# Patient Record
Sex: MALE | Race: WHITE | HISPANIC OR LATINO | Employment: FULL TIME | URBAN - METROPOLITAN AREA
[De-identification: names, ages, dates, MRNs, and addresses within clinical notes are randomized per-mention and may not be internally consistent; named-entity substitution may affect disease eponyms.]

---
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
## 2022-11-29 ENCOUNTER — HOSPITAL ENCOUNTER (EMERGENCY)
Facility: MEDICAL CENTER | Age: 22
End: 2022-11-30
Attending: EMERGENCY MEDICINE
Payer: MEDICAID

## 2022-11-29 VITALS
RESPIRATION RATE: 18 BRPM | BODY MASS INDEX: 24.39 KG/M2 | HEART RATE: 78 BPM | DIASTOLIC BLOOD PRESSURE: 70 MMHG | OXYGEN SATURATION: 95 % | TEMPERATURE: 97 F | WEIGHT: 190.04 LBS | SYSTOLIC BLOOD PRESSURE: 114 MMHG | HEIGHT: 74 IN

## 2022-11-29 DIAGNOSIS — G44.209 TENSION HEADACHE: ICD-10-CM

## 2022-11-29 DIAGNOSIS — B34.9 VIRAL SYNDROME: ICD-10-CM

## 2022-11-29 PROCEDURE — 99283 EMERGENCY DEPT VISIT LOW MDM: CPT

## 2022-11-29 PROCEDURE — C9803 HOPD COVID-19 SPEC COLLECT: HCPCS | Performed by: EMERGENCY MEDICINE

## 2022-11-29 RX ORDER — IBUPROFEN 600 MG/1
600 TABLET ORAL ONCE
Status: COMPLETED | OUTPATIENT
Start: 2022-11-30 | End: 2022-11-30

## 2022-11-29 RX ORDER — ACETAMINOPHEN 325 MG/1
650 TABLET ORAL ONCE
Status: COMPLETED | OUTPATIENT
Start: 2022-11-30 | End: 2022-11-30

## 2022-11-30 LAB
FLUAV RNA SPEC QL NAA+PROBE: POSITIVE
FLUBV RNA SPEC QL NAA+PROBE: NEGATIVE
RSV RNA SPEC QL NAA+PROBE: NEGATIVE
SARS-COV-2 RNA RESP QL NAA+PROBE: NOTDETECTED
SPECIMEN SOURCE: ABNORMAL

## 2022-11-30 PROCEDURE — A9270 NON-COVERED ITEM OR SERVICE: HCPCS | Performed by: EMERGENCY MEDICINE

## 2022-11-30 PROCEDURE — A9270 NON-COVERED ITEM OR SERVICE: HCPCS

## 2022-11-30 PROCEDURE — 700102 HCHG RX REV CODE 250 W/ 637 OVERRIDE(OP)

## 2022-11-30 PROCEDURE — 0241U HCHG SARS-COV-2 COVID-19 NFCT DS RESP RNA 4 TRGT MIC: CPT

## 2022-11-30 PROCEDURE — 700102 HCHG RX REV CODE 250 W/ 637 OVERRIDE(OP): Performed by: EMERGENCY MEDICINE

## 2022-11-30 RX ADMIN — ACETAMINOPHEN 650 MG: 325 TABLET, FILM COATED ORAL at 00:20

## 2022-11-30 RX ADMIN — IBUPROFEN 600 MG: 600 TABLET, FILM COATED ORAL at 00:20

## 2022-11-30 NOTE — DISCHARGE INSTRUCTIONS
You were tested for flu COVID and RSV today.  Results of come back later this evening you can check the results on [facility].  If it does come back positive for any those viruses know that you are contagious take ibuprofen Tylenol as needed for symptoms return to the ED for any concern for difficulty breathing or vomiting.

## 2022-11-30 NOTE — ED TRIAGE NOTES
"Margarito Huff  22 y.o.  Chief Complaint   Patient presents with    Headache     Ambulatory to lobby with steady gait, pt reporting pulsing headache after working today. Pt works in construction. Pt states \"I have a headache because I was sweating and it's cold outside.\" A&Ox4, speaking in full sentences, NAD, VSS, placed back in lobby.  "

## 2023-11-19 ENCOUNTER — APPOINTMENT (OUTPATIENT)
Dept: RADIOLOGY | Facility: IMAGING CENTER | Age: 23
End: 2023-11-19
Attending: STUDENT IN AN ORGANIZED HEALTH CARE EDUCATION/TRAINING PROGRAM
Payer: MEDICAID

## 2023-11-19 ENCOUNTER — OFFICE VISIT (OUTPATIENT)
Dept: URGENT CARE | Facility: CLINIC | Age: 23
End: 2023-11-19
Payer: MEDICAID

## 2023-11-19 VITALS
WEIGHT: 180 LBS | RESPIRATION RATE: 16 BRPM | BODY MASS INDEX: 23.1 KG/M2 | TEMPERATURE: 97.4 F | DIASTOLIC BLOOD PRESSURE: 62 MMHG | SYSTOLIC BLOOD PRESSURE: 94 MMHG | HEART RATE: 76 BPM | OXYGEN SATURATION: 98 % | HEIGHT: 74 IN

## 2023-11-19 DIAGNOSIS — S99.922A FOOT INJURY, LEFT, INITIAL ENCOUNTER: ICD-10-CM

## 2023-11-19 PROCEDURE — 73630 X-RAY EXAM OF FOOT: CPT | Mod: TC,LT | Performed by: STUDENT IN AN ORGANIZED HEALTH CARE EDUCATION/TRAINING PROGRAM

## 2023-11-19 PROCEDURE — 3078F DIAST BP <80 MM HG: CPT | Performed by: STUDENT IN AN ORGANIZED HEALTH CARE EDUCATION/TRAINING PROGRAM

## 2023-11-19 PROCEDURE — 99203 OFFICE O/P NEW LOW 30 MIN: CPT | Performed by: STUDENT IN AN ORGANIZED HEALTH CARE EDUCATION/TRAINING PROGRAM

## 2023-11-19 PROCEDURE — 3074F SYST BP LT 130 MM HG: CPT | Performed by: STUDENT IN AN ORGANIZED HEALTH CARE EDUCATION/TRAINING PROGRAM

## 2023-11-20 NOTE — PROGRESS NOTES
"Subjective     Margarito Huff is a 23 y.o. male who presents with Foot Injury            Margarito is a 23 y.o. male who presents to urgent care with left foot injury.  Patient states he was helping out with some construction and a pipe dropped on his left foot.  This occurred on Wednesday.  Left foot pain is improving since injury but he is still experiencing some pain/discomfort especially while walking him who presents to urgent care today hoping to get an x-ray.  Patient noticed some swelling/bruising initially which has gone down.  Patient is able to ambulate.  No decreased range of motion or numbness/tingling.        ROS           Objective     BP 94/62 (BP Location: Left arm, Patient Position: Sitting, BP Cuff Size: Adult)   Pulse 76   Temp 36.3 °C (97.4 °F) (Temporal)   Resp 16   Ht 1.88 m (6' 2\")   Wt 81.6 kg (180 lb)   SpO2 98%   BMI 23.11 kg/m²      Physical Exam  Vitals reviewed.   Constitutional:       Appearance: Normal appearance.   HENT:      Head: Normocephalic and atraumatic.      Nose: Nose normal.   Eyes:      Extraocular Movements: Extraocular movements intact.      Conjunctiva/sclera: Conjunctivae normal.      Pupils: Pupils are equal, round, and reactive to light.   Cardiovascular:      Rate and Rhythm: Normal rate.   Pulmonary:      Effort: Pulmonary effort is normal.   Musculoskeletal:      Left ankle: Normal.      Left foot: Normal range of motion and normal capillary refill. Normal pulse.        Feet:    Skin:     General: Skin is warm and dry.      Capillary Refill: Capillary refill takes less than 2 seconds.   Neurological:      General: No focal deficit present.      Mental Status: He is alert. Mental status is at baseline.      Gait: Gait is intact.                  RADIOLOGY RESULTS   DX-FOOT-COMPLETE 3+ LEFT    Result Date: 11/19/2023 11/19/2023 6:32 PM HISTORY/REASON FOR EXAM:  Pain/Deformity Following Trauma; 1st and 2nd metatarsals Crushing injury, dorsum pain x 4 " days TECHNIQUE/EXAM DESCRIPTION AND NUMBER OF VIEWS: 3 views of the LEFT foot. COMPARISON:  None. FINDINGS: No acute fracture or dislocation. No joint osteoarthritis.     No acute osseous abnormality.                   Assessment & Plan        1. Foot injury, left, initial encounter  - DX-FOOT-COMPLETE 3+ LEFT   - PER RADIOLOGY: No acute osseous abnormality.    Differential diagnoses, supportive care measures (rest, ice, elevation, OTC Tylenol/ibuprofen) and indications for immediate follow-up discussed with patient. Pathogenesis of diagnosis discussed including typical length and natural progression.  Follow up with PCP.    Instructed to return to urgent care or nearest emergency department if symptoms fail to improve, for any change in condition, further concerns, or new concerning symptoms.    Patient states understanding and agrees with the plan of care and discharge instructions.

## 2023-12-20 ENCOUNTER — APPOINTMENT (OUTPATIENT)
Dept: RADIOLOGY | Facility: IMAGING CENTER | Age: 23
End: 2023-12-20
Payer: OTHER MISCELLANEOUS

## 2023-12-20 ENCOUNTER — OCCUPATIONAL MEDICINE (OUTPATIENT)
Dept: URGENT CARE | Facility: CLINIC | Age: 23
End: 2023-12-20
Payer: OTHER MISCELLANEOUS

## 2023-12-20 VITALS
RESPIRATION RATE: 16 BRPM | TEMPERATURE: 98.1 F | WEIGHT: 171 LBS | SYSTOLIC BLOOD PRESSURE: 120 MMHG | HEART RATE: 74 BPM | HEIGHT: 74 IN | BODY MASS INDEX: 21.94 KG/M2 | OXYGEN SATURATION: 100 % | DIASTOLIC BLOOD PRESSURE: 82 MMHG

## 2023-12-20 DIAGNOSIS — S61.431A PUNCTURE WOUND OF RIGHT HAND WITHOUT FOREIGN BODY, INITIAL ENCOUNTER: ICD-10-CM

## 2023-12-20 DIAGNOSIS — S69.91XA INJURY OF RIGHT HAND, INITIAL ENCOUNTER: ICD-10-CM

## 2023-12-20 PROCEDURE — 73130 X-RAY EXAM OF HAND: CPT | Mod: TC,RT | Performed by: PHYSICIAN ASSISTANT

## 2023-12-20 RX ORDER — AMOXICILLIN AND CLAVULANATE POTASSIUM 875; 125 MG/1; MG/1
1 TABLET, FILM COATED ORAL 2 TIMES DAILY
Qty: 10 TABLET | Refills: 0 | Status: SHIPPED | OUTPATIENT
Start: 2023-12-20 | End: 2023-12-25

## 2023-12-20 NOTE — LETTER
[facility] [address] [place] [address]  Phone:  [phone] - Fax:  [phone]   [facility] Progress Report and Disability Certification  Date of Service: 12/20/2023   No Show:  No  Date / Time of Next Visit: 12/23/2023 5:00 PM   Claim Information   Patient Name: Margarito Huff  Claim Number:     Employer:   [employer] Date of Injury: 12/20/2023     Insurer / TPA:    ID / SSN:     Occupation: Construction  Diagnosis: The encounter diagnosis was Puncture wound of right hand without foreign body, initial encounter.    Medical Information   Related to Industrial Injury? 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  He states that he was moving boards and suffered a nail puncture to his right hand when attempting to flip into a 4.  He reports that his tetanus is up-to-date.  He reports pain to his right hand is 8\10.  He has not take anything for his pain.  He denies numbness and tingling.  He has right-hand-dominant.    Review of Systems   Musculoskeletal:         + Right hand pain      Objective Findings: Physical Exam  Vitals and nursing note reviewed.   Musculoskeletal:        Hands:        Comments: Right hand: Puncture wound to palmar aspect of mid hand.  No foreign body identified.  Full range of motion of right hand.  CMS intact        Pre-Existing Condition(s):     Assessment:   Initial Visit    Status: Additional Care Required  Permanent Disability:No    Plan:   Comments:Augmentin, alternate Tylenol ibuprofen as needed for pain    Diagnostics: X-ray    Comments:       Disability Information   Status: Released to Restricted Duty    From:  12/20/2023  Through: 12/23/2023 Restrictions are: Temporary   Physical Restrictions   Sitting:    Standing:    Stooping:    Bending:      Squatting:    Walking:    Climbing:    Pushing:      Pulling:    Other:    Reaching Above Shoulder (L):    Reaching Above Shoulder (R):       Reaching Below Shoulder (L):    Reaching Below Shoulder (R):      Not to exceed Weight Limits   Carrying(hrs):   Weight Limit(lb): < or = to 25 pounds  Comments:Right hand Lifting(hrs):   Weight  Limit(lb):     Comments: Limit use of right hand.  Keep wound covered while at work    Repetitive Actions   Hands: i.e. Fine Manipulations from Grasping:     Feet: i.e. Operating Foot Controls:     Driving / Operate Machinery:     Health Care Provider’s Original or Electronic Signature  GUDELIA Fortune Health Care Provider’s Original or Electronic Signature    Hans Somers DO MPH     Clinic Name / Location: [place] [address]  [place] [address] Clinic Phone Number: Dept: [phone]   Appointment Time: 5:00 Pm Visit Start Time: 6:11 PM   Check-In Time:  5:37 Pm Visit Discharge Time:     Original-Treating Physician or Chiropractor    Page 2-Insurer/TPA    Page 3-Employer    Page 4-Employee

## 2023-12-20 NOTE — LETTER
"    EMPLOYEE’S CLAIM FOR COMPENSATION/ REPORT OF INITIAL TREATMENT  FORM C-4  PLEASE TYPE OR PRINT    EMPLOYEE’S CLAIM - PROVIDE ALL INFORMATION REQUESTED   First Name                    HOA Pimentel Last Name  Celi Huff Birthdate                    2000                Sex  []M  []F Claim Number (Insurer’s Use Only)     Home Address  [address] Age  23 y.o. 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 (if applicable)  taking the nail of the wood plane    How did this injury or occupational disease occur? (Be specific and answer in detail. Use additional sheet if necessary)  I was taking the nails and I flip the plane wood ant then I got it in my hand   If you believe that you have an occupational disease, when did you first have knowledge of the disability and its relationship to your employment?  n/a Witnesses to the Accident (if applicable)  ja      Nature of Injury or Occupational Disease  Puncture  Part(s) of Body Injured or Affected  Hand (R) N/A N/A    I CERTIFY THAT THE " ABOVE IS TRUE AND CORRECT TO T HE BEST OF MY KNOWLEDGE AND THAT I HAVE PROVIDED THIS INFORMATION IN ORDER TO OBTAIN THE BENEFITS OF [place], OR CHAPTER [place]).  I HEREBY AUTHORIZE ANY PHYSICIAN, CHIROPRACTOR, SURGEON, PRACTITIONER OR ANY OTHER PERSON, ANY HOSPITAL, INCLUDING [facility] OR [facility], ANY  MEDICAL SERVICE ORGANIZATION, ANY INSURANCE COMPANY, OR OTHER INSTITUTION OR ORGANIZATION TO RELEASE TO EACH OTHER, ANY MEDICAL OR OTHER INFORMATION, INCLUDING BENEFITS PAID OR PAYABLE, PERTINENT TO THIS INJURY OR DISEASE, EXCEPT INFORMATION RELATIVE TO DIAGNOSIS, TREATMENT AND/OR COUNSELING FOR AIDS, PSYCHOLOGICAL CONDITIONS, ALCOHOL OR CONTROLLED SUBSTANCES, FOR WHICH I MUST GIVE SPECIFIC AUTHORIZATION.  A PHOTOSTAT OF THIS AUTHORIZATION SHALL BE VALID AS THE ORIGINAL.     Date 12/20/23   [facility] Employee’s Original or  *Electronic Signature   THIS REPORT MUST BE COMPLETED AND MAILED WITHIN 3 WORKING DAYS OF TREATMENT   Place  [facility]    Name of Facility  [facility]   Date 12/20/2023 Diagnosis and Description of Injury or Occupational Disease  (S61.431A) Puncture wound of right hand without foreign body, initial encounter  The encounter diagnosis was Puncture wound of right hand without foreign body, initial encounter. Is there evidence that the injured employee was under the influence of alcohol and/or another controlled substance at the time of accident?  []No  [] Yes (if yes, please explain)   Hour 6:11 PM  No   Treatment: Alternate Tylenol and ibuprofen as needed for pain, course of Augmentin, ice application    Have you advised the patient to remain off work five days or more?   [] Yes Indicate dates: From   To    []No If no, is the injured employee capable of: [] full duty [] modified duty                                                             No  Yes  If modified duty,  specify any limitations / restrictions:  Limit use of right hand                                                                                                                                                                                                                                                                                                                                                                                                               X-Ray Findings: Negative    From information given by the employee, together with medical evidence, can you directly connect this injury or occupational disease as job incurred?  []Yes   [] No Yes    Is additional medical care by a physician indicated? []Yes [] No  Yes    Do you know of any previous injury or disease contributing to this condition or occupational disease? []Yes [] No (Explain if yes)                          No   Date  12/20/2023 Print Health Care Provider’s Name  GUDELIA Fortune I certify that the employer’s copy of  this form was delivered to the employer on:   Address  [address] ONLY                       [place] Zip  [address] Provider’s Tax ID Number  [id number]   Telephone  Dept: [phone]    Health Care Provider’s Original or Electronic Signature  e-SignCARTER, JAM GALEAS Degree (MD,DO, DC,PA-C,APRN)  APRN  Choose (if applicable)      ORIGINAL - TREATING HEALTHCARE PROVIDER PAGE 2 - INSURER/TPA PAGE 3 - EMPLOYER PAGE 4 - EMPLOYEE             Form C-4 (rev.08/23)        BRIEF DESCRIPTION OF RIGHTS AND BENEFITS  (Pursuant to [place] 616C.050)    Notice of Injury or Occupational Disease (Incident Report Form C-1): If an injury or occupational disease (OD) arises out of and in the course of employment, you must provide written notice to your employer as soon as practicable, but no later than 7 days after the accident or OD. Your employer shall maintain a sufficient supply of the required  "forms.    Claim for Compensation (Form C-4): If medical treatment is sought, the form C-4 is available at the place of initial treatment. A completed \"Claim for Compensation\" (Form C-4) must be filed within 90 days after an accident or OD. The treating physician or chiropractor must, within 3 working days after treatment, complete and mail to the employer, the employer's insurer and third-party , the Claim for Compensation.    Medical Treatment: If you require medical treatment for your on-the-job injury or OD, you may be required to select a physician or chiropractor from a list provided by your workers’ compensation insurer, if it has contracted with an Organization for Managed Care (MCO) or Preferred Provider Organization (PPO) or providers of health care. If your employer has not entered into a contract with an MCO or PPO, you may select a physician or chiropractor from the Panel of Physicians and Chiropractors. Any medical costs related to your industrial injury or OD will be paid by your insurer.    Temporary Total Disability (TTD): If your doctor has certified that you are unable to work for a period of at least 5 consecutive days, or 5 cumulative days in a 20-day period, or places restrictions on you that your employer does not accommodate, you may be entitled to TTD compensation.    Temporary Partial Disability (TPD): If the wage you receive upon reemployment is less than the compensation for TTD to which you are entitled, the insurer may be required to pay you TPD compensation to make up the difference. TPD can only be paid for a maximum of 24 months.    Permanent Partial Disability (PPD): When your medical condition is stable and there is an indication of a PPD as a result of your injury or OD, within 30 days, your insurer must arrange for an evaluation by a rating physician or chiropractor to determine the degree of your PPD. The amount of your PPD award depends on the date of injury, the " results of the PPD evaluation, your age and wage.    Permanent Total Disability (PTD): If you are medically certified by a treating physician or chiropractor as permanently and totally disabled and have been granted a PTD status by your insurer, you are entitled to receive monthly benefits not to exceed 66 2/3% of your average monthly wage. The amount of your PTD payments is subject to reduction if you previously received a lump-sum PPD award.    Vocational Rehabilitation Services: You may be eligible for vocational rehabilitation services if you are unable to return to the job due to a permanent physical impairment or permanent restrictions as a result of your injury or occupational disease.    Transportation and Per Kyle Reimbursement: You may be eligible for travel expenses and per kyle associated with medical treatment.    Reopening: You may be able to reopen your claim if your condition worsens after claim closure.     Appeal Process: If you disagree with a written determination issued by the insurer or the insurer does not respond to your request, you may appeal to the Department of Administration, , by following the instructions contained in your determination letter. You must appeal the determination within 70 days from the date of the determination letter at [address], [address], [place], [place] [address], or [address], [place] [address]. If you disagree with the  decision, you may appeal to the Department of Administration, . You must file your appeal within 30 days from the date of the  decision letter at [address], [address], [place], [place] [address], or [address], [address], [place], [place] [address]. If you disagree with a decision of an , you may file a petition for judicial review with the District Court. You must do so within 30 days of the Appeal Officer’s  decision. You may be represented by an  at your own expense or you may contact the [facility] for possible representation.    [place]  for Injured Workers (NAIW): If you disagree with a  decision, you may request that NAIW represent you without charge at an  Hearing. For information regarding denial of benefits, you may contact the [facility] at: [address], [address], [place] [address], [phone], or [address], [address], [place] [address], [phone]    To File a Complaint with the Division: If you wish to file a complaint with the  of the Division of Industrial Relations (DIR),  please contact the Workers’ Compensation Section, [address], [address], [place], [place] [address], telephone [phone]) [phone], or [address], [place], [place] [address], telephone [phone].    For assistance with Workers’ Compensation Issues: You may contact the [facility] Office for Consumer Health Assistance, [address], [address], [place], [place] [address], Toll Free 1-720.935.2190, Web site: http://[facility].[place].gov/Programs/SHARI E-mail: shari@[facility].nv.[facility]              __________________________________________________________________                                    _________________            Employee Name / Signature                                                                                                                            Date                                                                                                                                                                                                                              D-2 (rev. 10/20)

## 2023-12-21 NOTE — PROGRESS NOTES
"Subjective:   Margarito Huff is a 23 y.o. 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  He states that he was moving boards and suffered a nail puncture to his right hand when attempting to flip into a 4.  He reports that his tetanus is up-to-date.  He reports pain to his right hand is 8\10.  He has not take anything for his pain.  He denies numbness and tingling.  He has right-hand-dominant.    Review of Systems   Musculoskeletal:         + Right hand pain         Problem list, medications, and allergies reviewed by myself today in Epic.     Objective:     /82 (BP Location: Left arm, Patient Position: Sitting, BP Cuff Size: Adult)   Pulse 74   Temp 36.7 °C (98.1 °F)   Resp 16   Ht 1.88 m (6' 2\")   Wt 77.6 kg (171 lb)   SpO2 100%   BMI 21.96 kg/m²     Physical Exam  Vitals and nursing note reviewed.   Musculoskeletal:        Hands:       Comments: Right hand: Puncture wound to palmar aspect of mid hand.  No foreign body identified.  Full range of motion of right hand.  CMS intact         Assessment/Plan:     Diagnosis and associated orders:   1. Puncture wound of right hand without foreign body, initial encounter  DX-HAND 3+ RIGHT    amoxicillin-clavulanate (AUGMENTIN) 875-125 MG Tab             Comments/MDM:   Pt is clinically stable at today's acute urgent care visit.  No acute distress noted. Appropriate for outpatient management at this time.     Acute problem.  Dx right hand is negative for acute fracture or foreign body.  Patient will be prescribed course of Augmentin prophylactically.  Tetanus is currently up-to-date.  Wound care discussed with patient.  Work restrictions per D39.  Discussed alternating Tylenol and ibuprofen for pain.  Patient is to return in 3 days for reevaluation.           Discussed DDx, management options (risks,benefits, and alternatives to planned treatment), natural " progression and supportive care.  Expressed understanding and the treatment plan was agreed upon. Questions were encouraged and answered   Return to urgent care prn if new or worsening sx or if there is no improvement in condition prn.    Educated in Red flags and indications to immediately call 911 or present to the Emergency Department.   Advised the patient to follow-up with the primary care physician for recheck, reevaluation, and consideration of further management.    I personally reviewed prior external notes and test results pertinent to today's visit.  I have independently reviewed and interpreted all diagnostics ordered during this urgent care acute visit.       Please note that this dictation was created using voice recognition software. I have made a reasonable attempt to correct obvious errors, but I expect that there are errors of grammar and possibly content that I did not discover before finalizing the note.    This note was electronically signed by BLAS Castellanos

## 2023-12-23 ENCOUNTER — OCCUPATIONAL MEDICINE (OUTPATIENT)
Dept: URGENT CARE | Facility: CLINIC | Age: 23
End: 2023-12-23
Payer: OTHER MISCELLANEOUS

## 2023-12-23 VITALS
WEIGHT: 174 LBS | RESPIRATION RATE: 14 BRPM | SYSTOLIC BLOOD PRESSURE: 120 MMHG | DIASTOLIC BLOOD PRESSURE: 54 MMHG | OXYGEN SATURATION: 98 % | BODY MASS INDEX: 22.33 KG/M2 | HEIGHT: 74 IN | HEART RATE: 78 BPM | TEMPERATURE: 97.8 F

## 2023-12-23 DIAGNOSIS — S61.431A PUNCTURE WOUND OF RIGHT HAND WITHOUT FOREIGN BODY, INITIAL ENCOUNTER: ICD-10-CM

## 2023-12-23 PROCEDURE — 99212 OFFICE O/P EST SF 10 MIN: CPT

## 2023-12-23 PROCEDURE — 3078F DIAST BP <80 MM HG: CPT

## 2023-12-23 PROCEDURE — 3074F SYST BP LT 130 MM HG: CPT

## 2023-12-23 NOTE — LETTER
[facility] [address] [place] [address]  Phone:  [phone] - Fax:  [phone]   [facility] Progress Report and Disability Certification  Date of Service: 12/23/2023   No Show:  No  Date / Time of Next Visit: 12/26/2023 @ 3pm   Claim Information   Patient Name: Margarito Huff  Claim Number:     Employer:    Date of Injury: 12/20/2023     Insurer / TPA:    ID / SSN:     Occupation: Construction  Diagnosis: The encounter diagnosis was Puncture wound of right hand without foreign body, initial encounter.    Medical Information   Related to Industrial Injury? No    Subjective Complaints:  Patient presents for his first WC FV. DOI: 12/20/23 C/C: Right mid hand palmar puncture wound  Patient reports he started the antibiotics as ordered.  He has had improvement in pain and drainage from his wound.  He is bruising has appeared.  He reports pain with pressure and with making a fist.  He has been using his left hand at work.  He denies fever, chills, worsening redness, pain, drainage, warmth.    Objective Findings: Mid palmar surface of right hand has 1 cm C-shaped puncture wound with stable eschar in place.  No surrounding erythema, edema, warmth, fluctuance.  There is bruising of the palmar surface of the thenar eminence which expands over to the first interdigital webspace. +AROM. NVI.   Pre-Existing Condition(s):     Assessment:   Condition Improved    Status: Additional Care Required  Permanent Disability:No    Plan:      Diagnostics:      Comments:       Disability Information   Status: Released to Restricted Duty    From:  12/23/2023  Through: 12/26/2023 Restrictions are: Temporary   Physical Restrictions   Sitting:    Standing:    Stooping:    Bending:      Squatting:    Walking:    Climbing:    Pushing:      Pulling:    Other:    Reaching Above Shoulder (L):   Reaching Above Shoulder (R):       Reaching Below Shoulder (L):    Reaching Below  Shoulder (R):      Not to exceed Weight Limits   Carrying(hrs):   Weight Limit(lb):   Lifting(hrs):   Weight  Limit(lb):     Comments: D39 updated today.  Patient is to avoid use of right hand while at work.  Treatment: Continue with antibiotics as ordered.  Apply ice packs several times a day, elevate right arm to help with swelling.  NSAIDs/Tylenol per package instructions for pain and discomfort.  Follow-up: 3 days      Repetitive Actions   Hands: i.e. Fine Manipulations from Grasping:     Feet: i.e. Operating Foot Controls:     Driving / Operate Machinery:     Health Care Provider’s Original or Electronic Signature  GUDELIA Bundy Health Care Provider’s Original or Electronic Signature    Hans Somers DO MPH     Clinic Name / Location: [place] [address]  [place] [address] Clinic Phone Number: Dept: [phone]   Appointment Time: 5:00 Pm Visit Start Time: 5:09 PM   Check-In Time:  5:06 Pm Visit Discharge Time:     Original-Treating Physician or Chiropractor    Page 2-Insurer/TPA    Page 3-Employer    Page 4-Employee

## 2023-12-24 NOTE — PROGRESS NOTES
"Subjective:     Margarito Huff is a 23 y.o. male who presents for Follow-Up (Workers comp )      Patient presents for his first WC FV. DOI: 12/20/23 C/C: Right mid hand palmar puncture wound  Patient reports he started the antibiotics as ordered.  He has had improvement in pain and drainage from his wound.  He is bruising has appeared.  He reports pain with pressure and with making a fist.  He has been using his left hand at work.  He denies fever, chills, worsening redness, pain, drainage, warmth.     PMH:   No pertinent past medical history to this problem  MEDS:  Medications were reviewed in EMR  ALLERGIES:  Allergies were reviewed in EMR  SOCHX:  Works as in construction  FH:   No pertinent family history to this problem       Objective:     /54 (BP Location: Left arm, Patient Position: Sitting)   Pulse 78   Temp 36.6 °C (97.8 °F) (Temporal)   Resp 14   Ht 1.88 m (6' 2\")   Wt 78.9 kg (174 lb)   SpO2 98%   BMI 22.34 kg/m²     Mid palmar surface of right hand has 1 cm C-shaped puncture wound with stable eschar in place.  No surrounding erythema, edema, warmth, fluctuance.  There is bruising of the palmar surface of the thenar eminence which expands over to the first interdigital webspace. +AROM. NVI.    Assessment/Plan:       1. Puncture wound of right hand without foreign body, initial encounter    Released to Restricted Duty FROM 12/23/2023 TO 12/26/2023  D39 updated today.  Patient is to avoid use of right hand while at work.  Treatment: Continue with antibiotics as ordered.  Apply ice packs several times a day, elevate right arm to help with swelling.  NSAIDs/Tylenol per package instructions for pain and discomfort.  Follow-up: 3 days         Differential diagnosis, natural history, supportive care, and indications for immediate follow-up discussed.    "

## 2023-12-26 ENCOUNTER — APPOINTMENT (OUTPATIENT)
Dept: URGENT CARE | Facility: CLINIC | Age: 23
End: 2023-12-26
Payer: OTHER MISCELLANEOUS

## 2023-12-29 ENCOUNTER — OCCUPATIONAL MEDICINE (OUTPATIENT)
Dept: URGENT CARE | Facility: CLINIC | Age: 23
End: 2023-12-29
Payer: OTHER MISCELLANEOUS

## 2023-12-29 VITALS
OXYGEN SATURATION: 98 % | SYSTOLIC BLOOD PRESSURE: 118 MMHG | RESPIRATION RATE: 16 BRPM | TEMPERATURE: 98.6 F | HEART RATE: 102 BPM | DIASTOLIC BLOOD PRESSURE: 64 MMHG

## 2023-12-29 DIAGNOSIS — S61.431D PUNCTURE WOUND OF RIGHT HAND WITHOUT FOREIGN BODY, SUBSEQUENT ENCOUNTER: ICD-10-CM

## 2023-12-29 PROCEDURE — 3074F SYST BP LT 130 MM HG: CPT | Performed by: STUDENT IN AN ORGANIZED HEALTH CARE EDUCATION/TRAINING PROGRAM

## 2023-12-29 PROCEDURE — 3078F DIAST BP <80 MM HG: CPT | Performed by: STUDENT IN AN ORGANIZED HEALTH CARE EDUCATION/TRAINING PROGRAM

## 2023-12-29 PROCEDURE — 99213 OFFICE O/P EST LOW 20 MIN: CPT | Performed by: STUDENT IN AN ORGANIZED HEALTH CARE EDUCATION/TRAINING PROGRAM

## 2023-12-29 NOTE — LETTER
[facility] [address] [place] [address]  Phone:  [phone] - Fax:  [phone]   [facility] Progress Report and Disability Certification  Date of Service: 12/29/2023   No Show:  No  Date / Time of Next Visit:  Discharged   Claim Information   Patient Name: Margarito Huff  Claim Number:     Employer:      Date of Injury: 12/20/2023     Insurer / TPA: [employer] Workers [employer]    ID / SSN:     Occupation: Construction    Diagnosis: The encounter diagnosis was Puncture wound of right hand without foreign body, subsequent encounter.    Medical Information   Related to Industrial Injury?        Subjective Complaints:  Patient here for follow-up puncture wound right hand.  Says he has discontinued antibiotics.  Says he is no longer having pain and would like to return to full duties.   Objective Findings: Gen: no acute distress, normal voice  Skin: dry, intact, moist mucosal membranes  Head: Atraumatic, normocephalic  Psych: normal affect, normal judgement, alert, awake  Musculoskeletal: Right hand: Superficial puncture wound palmar surface without any surrounding erythema, edema or evidence of secondary subcutaneous infection.     Pre-Existing Condition(s):     Assessment:   Condition Improved    Status: Discharged /  MMI  Permanent Disability:     Plan:      Diagnostics:      Comments:       Disability Information   Status: Released to Full Duty    From:     Through:   Restrictions are:     Physical Restrictions   Sitting:    Standing:    Stooping:    Bending:      Squatting:    Walking:    Climbing:    Pushing:      Pulling:    Other:    Reaching Above Shoulder (L):   Reaching Above Shoulder (R):       Reaching Below Shoulder (L):    Reaching Below Shoulder (R):      Not to exceed Weight Limits   Carrying(hrs):   Weight Limit(lb):   Lifting(hrs):   Weight  Limit(lb):     Comments: Discharge/MMI    Repetitive Actions   Hands: i.e. Fine Manipulations from  Grasping:     Feet: i.e. Operating Foot Controls:     Driving / Operate Machinery:     Health Care Provider’s Original or Electronic Signature  Judah Romero D.O. Health Care Provider’s Original or Electronic Signature    Hans Somers DO MPH     Clinic Name / Location: [place] [address]  [place] [address] Clinic Phone Number: Dept: [phone]   Appointment Time: 6:00 Pm Visit Start Time: 5:40 PM   Check-In Time:  5:38 Pm Visit Discharge Time:  5:55 PM   Original-Treating Physician or Chiropractor    Page 2-Insurer/TPA    Page 3-Employer    Page 4-Employee

## 2023-12-30 NOTE — PROGRESS NOTES
Subjective:     Margarito Huff is a 23 y.o. male who presents for Follow-Up (Puncture wound of R hand, pt states it feels better)      Patient here for follow-up puncture wound right hand.  Says he has discontinued antibiotics.  Says he is no longer having pain and would like to return to full duties.    PMH:   No pertinent past medical history to this problem  MEDS:  Medications were reviewed in EMR  ALLERGIES:  Allergies were reviewed in EMR  FH:   No pertinent family history to this problem       Objective:     /64   Pulse (!) 102   Temp 37 °C (98.6 °F)   Resp 16   SpO2 98%     Gen: no acute distress, normal voice  Skin: dry, intact, moist mucosal membranes  Head: Atraumatic, normocephalic  Psych: normal affect, normal judgement, alert, awake  Musculoskeletal: Right hand: Superficial puncture wound palmar surface without any surrounding erythema, edema or evidence of secondary subcutaneous infection.      Assessment/Plan:       1. Puncture wound of right hand without foreign body, subsequent encounter    Released to Full Duty FROM   TO    Discharge/MMI       Differential diagnosis, natural history, supportive care, and indications for immediate follow-up discussed.

## 2024-07-31 ENCOUNTER — PHARMACY VISIT (OUTPATIENT)
Dept: PHARMACY | Facility: MEDICAL CENTER | Age: 24
End: 2024-07-31
Payer: COMMERCIAL

## 2024-07-31 ENCOUNTER — HOSPITAL ENCOUNTER (EMERGENCY)
Facility: MEDICAL CENTER | Age: 24
End: 2024-07-31
Attending: EMERGENCY MEDICINE
Payer: COMMERCIAL

## 2024-07-31 VITALS
DIASTOLIC BLOOD PRESSURE: 74 MMHG | HEIGHT: 74 IN | HEART RATE: 77 BPM | SYSTOLIC BLOOD PRESSURE: 112 MMHG | OXYGEN SATURATION: 97 % | BODY MASS INDEX: 24.45 KG/M2 | WEIGHT: 190.48 LBS | RESPIRATION RATE: 16 BRPM | TEMPERATURE: 98.2 F

## 2024-07-31 DIAGNOSIS — L24.5 IRRITANT CONTACT DERMATITIS DUE TO OTHER CHEMICAL PRODUCTS: ICD-10-CM

## 2024-07-31 DIAGNOSIS — R21 RASH: ICD-10-CM

## 2024-07-31 PROCEDURE — A9270 NON-COVERED ITEM OR SERVICE: HCPCS | Mod: UD | Performed by: EMERGENCY MEDICINE

## 2024-07-31 PROCEDURE — RXMED WILLOW AMBULATORY MEDICATION CHARGE: Performed by: EMERGENCY MEDICINE

## 2024-07-31 PROCEDURE — 700102 HCHG RX REV CODE 250 W/ 637 OVERRIDE(OP): Mod: UD | Performed by: EMERGENCY MEDICINE

## 2024-07-31 PROCEDURE — 99283 EMERGENCY DEPT VISIT LOW MDM: CPT

## 2024-07-31 RX ORDER — TRIAMCINOLONE ACETONIDE 1 MG/G
1 CREAM TOPICAL 2 TIMES DAILY
Qty: 30 G | Refills: 0 | Status: SHIPPED | OUTPATIENT
Start: 2024-07-31 | End: 2024-08-10

## 2024-07-31 RX ORDER — DIPHENHYDRAMINE HCL 25 MG
25 TABLET ORAL ONCE
Status: COMPLETED | OUTPATIENT
Start: 2024-07-31 | End: 2024-07-31

## 2024-07-31 RX ADMIN — DIPHENHYDRAMINE HYDROCHLORIDE 25 MG: 25 TABLET ORAL at 23:04

## 2024-08-01 NOTE — ED PROVIDER NOTES
ED Provider Note    CHIEF COMPLAINT  Chief Complaint   Patient presents with    Rash     2 weeks ago, believes acid from work fell on his arms. Redness and rash to bilateral lower arms. (+) itching.     Psych Mike     Wants to be evaluated for his ADHD.      EXTERNAL RECORDS REVIEWED  Outpatient encounter from 11/19/2023 when the patient was seen for foot injury with x-rays obtained.  No further recent encounters.    HPI/ROS  LIMITATION TO HISTORY   Select: : None  OUTSIDE HISTORIAN(S):  none    Margarito Huff is a 23 y.o. male who presents to the emergency room for progressive rashes been happening on the distal portions of his forearms over the course of several weeks.  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  Overall the patient reports that he has had several splashes of the product on his forearms, this would rise up irritating lesions that eventually lead to itching that he would scratch at during his sleep.  Continues to have this with no discharge or oozing and notes he tries to cover this but sometimes it does not work.  He continues to have intermittent resolution and return to this as he continues to use his cleaning products.  He tries his best to wear gloves though sometimes this does not happen.    Additionally requesting referral to primary care doctor as he has not had a formal primary care doctor in some time.    PAST MEDICAL HISTORY   has a past medical history of ADHD.    SURGICAL HISTORY  patient denies any surgical history    FAMILY HISTORY  History reviewed. No pertinent family history.    SOCIAL HISTORY  Social History     Tobacco Use    Smoking status: Never    Smokeless tobacco: Never   Vaping Use    Vaping status: Never Used   Substance and Sexual Activity    Alcohol use: Not Currently    Drug use: Not Currently     Comment: THC WAX    Sexual activity: Not on file       CURRENT MEDICATIONS  Home Medications   "     Reviewed by Rupali Joshi R.N. (Registered Nurse) on 07/31/24 at 2141  Med List Status: Partial     Medication Last Dose Status   amphetamine-dextroamphetamine XR (ADDERALL XR) 10 MG CAPSULE SR 24 HR  Active   guanFACINE (TENEX) 1 MG Tab  Active                    ALLERGIES  No Known Allergies    PHYSICAL EXAM  VITAL SIGNS: /74   Pulse 77   Temp 36.8 °C (98.2 °F) (Temporal)   Resp 16   Ht 1.88 m (6' 2\")   Wt 86.4 kg (190 lb 7.6 oz)   SpO2 97%   BMI 24.46 kg/m²    Genl: M sitting in gurney comfortably, speaking clearly, appears in no acute distress   Head: NC/AT   Pulmonary: Lungs are clear to auscultation bilaterally  Chest: No TTP  CV:  RRR, no murmur appreciated  Abdomen: soft, NT/ND  Musculoskeletal: Pain free ROM of the neck. Moving upper and lower extremities in spontaneous and coordinated fashion  Bilateral forearms show areas of macular mixed hive-like lesions, excoriations, no   Skin: as above.  No pallor or jaundice.  No cyanosis.  Warm and dry.     EKG/LABS  none    RADIOLOGY/PROCEDURES   none    COURSE & MEDICAL DECISION MAKING    ASSESSMENT, COURSE AND PLAN  Care Narrative: Patient was seen and evaluated for symptoms as described above.  The patient has exposures on repeat occasions to contact irritant in his job has areas of what appear to be contact dermatitis with some areas of inflammation and irritation and secondary changes due to excoriations.  There is no signs of concurrent cellulitic change he has stable vital signs and we have discussed at length coverage of these lesions that he does not continue to scratch them especially at sleep and the use of sleeves and other long protective garments while working with gloves to prevent reexposure.  Patient is being having these over the course of multiple weeks and has they are more fulminant at this time and I will recommend he apply twice daily triamcinolone cream in addition to taking Benadryl as needed at night for his " itchiness.  He is given very strict return precautions and while he has a listed PCP he says he has not been established with this person and would like a referral for contact numbers for seeking a new PCP.  This is placed and the patient is discharged home in stable condition.    DISPOSITION AND DISCUSSIONS  I have discussed management of the patient with the following physicians and MEGHAN's:  none    Discussion of management with other [facility] or appropriate source(s): None     Escalation of care considered, and ultimately not performed:Laboratory analysis and diagnostic imaging    Barriers to care at this time, including but not limited to: Patient does not have established PCP.     Decision tools and prescription drugs considered including, but not limited to:  benadryl/triamcinalone cream .    FINAL DIAGNOSIS  1. Irritant contact dermatitis due to other chemical products    2. Rash      Electronically signed by: Zachary Mendoza M.D., 7/31/2024 10:36 PM

## 2024-08-01 NOTE — ED NOTES
"Pt discharged home, pt A&Ox4, on room air, steady gait. pt in possession of belongings. Pt provided discharge education and information pertaining to medications and follow up appointments. Pt received copy of discharge instructions and verbalized understanding. /74   Pulse 77   Temp 36.8 °C (98.2 °F) (Temporal)   Resp 16   Ht 1.88 m (6' 2\")   Wt 86.4 kg (190 lb 7.6 oz)   SpO2 97%   BMI 24.46 kg/m²     "

## 2024-08-01 NOTE — ED NOTES
Rash to L wrist and R hand covered with adaptic and wrapped in gauze per ERP request. Pt tolerated well. CMS intact before and after bandage application.

## 2024-08-01 NOTE — ED TRIAGE NOTES
Chief Complaint   Patient presents with    Rash     2 weeks ago, believes acid from work fell on his arms. Redness and rash to bilateral lower arms. (+) itching.     Psych Eval     Wants to be evaluated for his ADHD.

## 2024-08-26 ENCOUNTER — HOSPITAL ENCOUNTER (OUTPATIENT)
Facility: MEDICAL CENTER | Age: 24
End: 2024-08-26
Attending: PHYSICIAN ASSISTANT
Payer: COMMERCIAL

## 2024-08-26 ENCOUNTER — OFFICE VISIT (OUTPATIENT)
Dept: URGENT CARE | Facility: CLINIC | Age: 24
End: 2024-08-26
Payer: COMMERCIAL

## 2024-08-26 ENCOUNTER — APPOINTMENT (OUTPATIENT)
Dept: URGENT CARE | Facility: CLINIC | Age: 24
End: 2024-08-26
Payer: COMMERCIAL

## 2024-08-26 VITALS
BODY MASS INDEX: 24.77 KG/M2 | HEIGHT: 74 IN | WEIGHT: 193 LBS | RESPIRATION RATE: 14 BRPM | DIASTOLIC BLOOD PRESSURE: 62 MMHG | SYSTOLIC BLOOD PRESSURE: 100 MMHG | HEART RATE: 70 BPM | OXYGEN SATURATION: 96 % | TEMPERATURE: 97.9 F

## 2024-08-26 DIAGNOSIS — Z11.3 SCREEN FOR STD (SEXUALLY TRANSMITTED DISEASE): ICD-10-CM

## 2024-08-26 LAB
APPEARANCE UR: CLEAR
BILIRUB UR STRIP-MCNC: NEGATIVE MG/DL
COLOR UR AUTO: NORMAL
GLUCOSE UR STRIP.AUTO-MCNC: NEGATIVE MG/DL
KETONES UR STRIP.AUTO-MCNC: NEGATIVE MG/DL
LEUKOCYTE ESTERASE UR QL STRIP.AUTO: NEGATIVE
NITRITE UR QL STRIP.AUTO: NEGATIVE
PH UR STRIP.AUTO: 7 [PH] (ref 5–8)
PROT UR QL STRIP: NORMAL MG/DL
RBC UR QL AUTO: NEGATIVE
SP GR UR STRIP.AUTO: 1.02
UROBILINOGEN UR STRIP-MCNC: 1 MG/DL

## 2024-08-26 PROCEDURE — 87591 N.GONORRHOEAE DNA AMP PROB: CPT

## 2024-08-26 PROCEDURE — 87491 CHLMYD TRACH DNA AMP PROBE: CPT

## 2024-08-26 ASSESSMENT — ENCOUNTER SYMPTOMS: ABDOMINAL PAIN: 0

## 2024-08-27 LAB
C TRACH DNA SPEC QL NAA+PROBE: NEGATIVE
N GONORRHOEA DNA SPEC QL NAA+PROBE: NEGATIVE
SPECIMEN SOURCE: NORMAL

## 2024-11-19 ENCOUNTER — HOSPITAL ENCOUNTER (EMERGENCY)
Facility: MEDICAL CENTER | Age: 24
End: 2024-11-19
Attending: STUDENT IN AN ORGANIZED HEALTH CARE EDUCATION/TRAINING PROGRAM
Payer: COMMERCIAL

## 2024-11-19 VITALS
OXYGEN SATURATION: 96 % | RESPIRATION RATE: 16 BRPM | HEIGHT: 74 IN | DIASTOLIC BLOOD PRESSURE: 56 MMHG | WEIGHT: 190.04 LBS | SYSTOLIC BLOOD PRESSURE: 116 MMHG | BODY MASS INDEX: 24.39 KG/M2 | HEART RATE: 74 BPM | TEMPERATURE: 96.9 F

## 2024-11-19 DIAGNOSIS — R11.2 NAUSEA AND VOMITING, UNSPECIFIED VOMITING TYPE: ICD-10-CM

## 2024-11-19 PROCEDURE — 700111 HCHG RX REV CODE 636 W/ 250 OVERRIDE (IP): Performed by: STUDENT IN AN ORGANIZED HEALTH CARE EDUCATION/TRAINING PROGRAM

## 2024-11-19 PROCEDURE — 99283 EMERGENCY DEPT VISIT LOW MDM: CPT

## 2024-11-19 RX ORDER — ONDANSETRON 4 MG/1
4 TABLET, ORALLY DISINTEGRATING ORAL EVERY 6 HOURS PRN
Qty: 10 TABLET | Refills: 0 | Status: SHIPPED | OUTPATIENT
Start: 2024-11-19

## 2024-11-19 RX ORDER — ONDANSETRON 4 MG/1
4 TABLET, ORALLY DISINTEGRATING ORAL ONCE
Status: COMPLETED | OUTPATIENT
Start: 2024-11-19 | End: 2024-11-19

## 2024-11-19 RX ADMIN — ONDANSETRON 4 MG: 4 TABLET, ORALLY DISINTEGRATING ORAL at 01:09

## 2024-11-19 NOTE — ED PROVIDER NOTES
CHIEF COMPLAINT  Chief Complaint   Patient presents with    N/V     Started today denies abd pain   Vomits after eating        LIMITATION TO HISTORY   Select:     HPI    The patient is a gym enthusiast presenting with nausea and vomiting that began after consuming a meal outside their usual diet on Sunday. They describe eating chicken and several other foods (including oatmeal, banana, almond butter, eggs, sanchez, mozzarella cheese, and chicken) prior to the onset of symptoms. Symptoms began abruptly later that day, with recurrent vomiting after eating. They suspect the episode was triggered by the Sunday meal. No other associated symptoms such as diarrhea, fever, or abdominal pain were reported.        OUTSIDE HISTORIAN(S):  Select:    EXTERNAL RECORDS REVIEWED  Select:       PAST MEDICAL HISTORY  Past Medical History:   Diagnosis Date    ADHD      .    SURGICAL HISTORY  No past surgical history on file.      FAMILY HISTORY  No family history on file.       SOCIAL HISTORY  Social History     Socioeconomic History    Marital status: Single     Spouse name: Not on file    Number of children: Not on file    Years of education: Not on file    Highest education level: Not on file   Occupational History    Not on file   Tobacco Use    Smoking status: Never    Smokeless tobacco: Never   Vaping Use    Vaping status: Never Used   Substance and Sexual Activity    Alcohol use: Not Currently    Drug use: Not Currently     Comment: THC WAX    Sexual activity: Yes   Other Topics Concern    Not on file   Social History Narrative    Not on file     Social Drivers of Health     Financial Resource Strain: Not on file   Food Insecurity: Not on file   Transportation Needs: Not on file   Physical Activity: Not on file   Stress: Not on file   Social Connections: Not on file   Intimate Partner Violence: Not on file   Housing Stability: Not on file         CURRENT MEDICATIONS  No current facility-administered medications on file prior to  "encounter.     Current Outpatient Medications on File Prior to Encounter   Medication Sig Dispense Refill    guanFACINE (TENEX) 1 MG Tab Take 1 mg by mouth every day. (Patient not taking: Reported on 11/19/2023)      amphetamine-dextroamphetamine XR (ADDERALL XR) 10 MG CAPSULE SR 24 HR Take 10 mg by mouth every morning. (Patient not taking: Reported on 11/19/2023)             ALLERGIES  No Known Allergies    PHYSICAL EXAM  VITAL SIGNS:/56   Pulse 74   Temp 36.1 °C (96.9 °F)   Resp 16   Ht 1.89 m (6' 2.4\")   Wt 86.2 kg (190 lb 0.6 oz)   SpO2 96%   BMI 24.14 kg/m²       GENERAL: Awake and alert  HEAD: Normocephalic and atraumatic  NECK: Normal range of motion, without meningismus  EYES: Pupils Equal, Round, Reactive to Light, extraocular movements intact, conjunctiva white  ENT: Mucous membranes moist, oropharynx clear  PULMONARY: Normal effort, clear to auscultation  CARDIOVASCULAR: No murmurs, clicks or rubs, peripheral pulses 2+  ABDOMINAL: Soft, non-tender, no guarding or rigidity present, no pulsatile masses  BACK: no midline tenderness, no costovertebral tenderness  NEUROLOGICAL: Grossly non-focal neurological examination, speech normal, gait normal  EXTREMITIES: No edema, normal to inspection  SKIN: Warm and dry.  PSYCHIATRIC: Affect is appropriate    DIAGNOSTIC STUDIES / PROCEDURES  EKG      COURSE & MEDICAL DECISION MAKING    ED COURSE:        INTERVENTIONS BY ME:  Medications   ondansetron (Zofran ODT) dispertab 4 mg (4 mg Oral Given 11/19/24 0109)       Response on recheck:    Tolerating oral food and fluids without difficulty   INITIAL ASSESSMENT, COURSE AND PLAN  Care Narrative:       Given the history of recent dietary indiscretion, the most likely diagnosis is acute gastroenteritis or food poisoning, potentially from contaminated food or transient gastrointestinal upset. Differential diagnoses to consider include gastrointestinal obstruction (although no abdominal pain or distention is " present), acute pancreatitis (unlikely due to normal abdominal findings), and metabolic derangements such as uremia or toxin ingestion, given the lack of systemic symptoms or supportive history for these conditions.    Initial management involved the administration of ondansetron (4 mg orally) with significant improvement, as the patient was able to tolerate oral intake. Laboratory studies and imaging were deferred, given the benign clinical course and absence of alarming features such as persistent symptoms, fever, or hemodynamic instability.    The patient was discharged with a prescription for ondansetron to use as needed for recurrent symptoms and advised to maintain hydration with oral rehydration solutions. Return precautions include worsening vomiting, inability to tolerate fluids, fever, severe abdominal pain, or signs of systemic illness, warranting immediate medical evaluation.           ADDITIONAL PROBLEM LIST    DISPOSITION AND DISCUSSIONS  Discussion of management with other [facility] or appropriate source(s): None     I have discussed management of the patient with the following physicians and MEGHAN's:      Escalation of care considered, and ultimately not performed:Laboratory analysis and diagnostic imaging    Barriers to care at this time, including but not limited to:     Decision tools and prescription drugs considered including, but not limited to:      Medication List        START taking these medications        Instructions   ondansetron 4 MG Tbdp  Commonly known as: Zofran ODT   Take 1 Tablet by mouth every 6 hours as needed for Nausea/Vomiting for up to 12 doses.  Dose: 4 mg            ASK your doctor about these medications        Instructions   amphetamine-dextroamphetamine XR 10 MG Cp24  Commonly known as: Adderall XR   Take 10 mg by mouth every morning.  Dose: 10 mg     guanFACINE 1 MG Tabs  Commonly known as: Tenex   Take 1 mg by mouth every day.  Dose: 1 mg                FINAL DIAGNOSIS  1.  Nausea and vomiting, unspecified vomiting type             Electronically signed by: Mert Arenas DO ,2:30 AM 11/19/24

## 2024-11-19 NOTE — ED TRIAGE NOTES
"Chief Complaint   Patient presents with    N/V     Started today denies abd pain   Vomits after eating    /81   Pulse 80   Temp 36.1 °C (96.9 °F)   Resp 16   Ht 1.89 m (6' 2.4\")   Wt 86.2 kg (190 lb 0.6 oz)   SpO2 96%   BMI 24.14 kg/m²     "

## 2024-11-19 NOTE — ED NOTES
Dc instructions and medications discussed with patient at bedside. All questions answered at this time. VSS. Pt to lobby without incident. Self to drive patient home.

## 2025-03-24 ENCOUNTER — APPOINTMENT (OUTPATIENT)
Dept: RADIOLOGY | Facility: MEDICAL CENTER | Age: 25
End: 2025-03-24
Attending: EMERGENCY MEDICINE
Payer: OTHER MISCELLANEOUS

## 2025-03-24 ENCOUNTER — HOSPITAL ENCOUNTER (EMERGENCY)
Facility: MEDICAL CENTER | Age: 25
End: 2025-03-25
Attending: EMERGENCY MEDICINE
Payer: OTHER MISCELLANEOUS

## 2025-03-24 DIAGNOSIS — S62.632A CLOSED DISPLACED FRACTURE OF DISTAL PHALANX OF RIGHT MIDDLE FINGER, INITIAL ENCOUNTER: ICD-10-CM

## 2025-03-24 PROCEDURE — 99283 EMERGENCY DEPT VISIT LOW MDM: CPT

## 2025-03-24 PROCEDURE — 73140 X-RAY EXAM OF FINGER(S): CPT | Mod: RT

## 2025-03-24 RX ORDER — ACETAMINOPHEN 500 MG
1000 TABLET ORAL ONCE
Status: COMPLETED | OUTPATIENT
Start: 2025-03-25 | End: 2025-03-25

## 2025-03-24 ASSESSMENT — PAIN DESCRIPTION - PAIN TYPE: TYPE: ACUTE PAIN

## 2025-03-24 NOTE — LETTER
"  EMPLOYEE’S CLAIM FOR COMPENSATION/ REPORT OF INITIAL TREATMENT  FORM C-4  PLEASE TYPE OR PRINT    EMPLOYEE’S CLAIM - PROVIDE ALL INFORMATION REQUESTED   First Name                    HOA Pimentel                  Last Name  Celi Huff Birthdate                    2000                Sex  [x]Male Claim Number (Insurer’s Use Only)     Mailing Address  [address] Age  24 y.o. 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 (Be specific and answer in detail. Use additional sheet if necessary)  When I was chipping hammering, the hammer when I hit the wall with  my hand.   If you believe that you have an occupational disease, when did you first have knowledge of the disability and its relationship to your employment?   Witnesses to the Accident (if applicable)  Karthik Rojas of Injury or Occupational Disease  Workers' Compensation  Part(s) of Body Injured or Affected  Hand (R) Finger (R)     I CERTIFY THAT THE ABOVE IS TRUE AND CORRECT TO T HE BEST OF MY KNOWLEDGE AND THAT I " HAVE PROVIDED THIS INFORMATION IN ORDER TO OBTAIN THE BENEFITS OF [place], OR CHAPTER 617 OF [place]).  I HEREBY AUTHORIZE ANY PHYSICIAN, CHIROPRACTOR, SURGEON, PRACTITIONER OR ANY OTHER PERSON, ANY HOSPITAL, INCLUDING [facility] OR [facility], ANY  MEDICAL SERVICE ORGANIZATION, ANY INSURANCE COMPANY, OR OTHER INSTITUTION OR ORGANIZATION TO RELEASE TO EACH OTHER, ANY MEDICAL OR OTHER INFORMATION, INCLUDING BENEFITS PAID OR PAYABLE, PERTINENT TO THIS INJURY OR DISEASE, EXCEPT INFORMATION RELATIVE TO DIAGNOSIS, TREATMENT AND/OR COUNSELING FOR AIDS, PSYCHOLOGICAL CONDITIONS, ALCOHOL OR CONTROLLED SUBSTANCES, FOR WHICH I MUST GIVE SPECIFIC AUTHORIZATION.  A PHOTOSTAT OF THIS AUTHORIZATION SHALL BE VALID AS THE ORIGINAL.     Date   Place Employee’s Original or  *Electronic Signature   THIS REPORT MUST BE COMPLETED AND MAILED WITHIN 3 WORKING DAYS OF TREATMENT   Place  [facility], EMERGENCY DEPT    Name of Facility      Date 3/24/2025 Diagnosis and Description of Injury or Occupational Disease  (S62.632A) Closed displaced fracture of distal phalanx of right middle finger, initial encounter  The encounter diagnosis was Closed displaced fracture of distal phalanx of right middle finger, initial encounter. Is there evidence that the injured employee was under the influence of alcohol and/or another controlled substance at the time of accident?  []No  [] Yes (if yes, please explain)   Hour       Treatment:      Have you advised the patient to remain off work five days or more?      [] Yes  If yes, indicate dates: From_ _                                                      To __ _  [] No   If no, is the injured employee capable of: [] full duty     [] modified duty      If modified duty, specify any limitations / restrictions:__________________  ___ ___________________________     X-Ray Findings:      From  information given by the employee, together with medical evidence, can you directly connect this injury or occupational disease as job incurred?  []Yes   [] No      Is additional medical care by a physician indicated? []Yes [] No       Do you know of any previous injury or disease contributing to this condition or occupational disease? []Yes [] No (Explain if yes)                              Date  3/25/2025 Print Health Care Provider’s Name  No name on file I certify that the employer’s copy of  this form was delivered to the employer on:   Address    INSURER'S USE ONLY                       City    State    Zip    Provider’s Tax ID Number      Telephone  Dept: [phone]    Health Care Provider’s Original or Electronic Signature           Degree (MD,DO, DC,PA-C,APRN)  {Provider Degrees:51833}  Choose (if applicable)      ORIGINAL - TREATING HEALTHCARE PROVIDER PAGE 2 - INSURER/TPA PAGE 3 - EMPLOYER PAGE 4 - EMPLOYEE             Form C-4 (rev.02/25)

## 2025-03-24 NOTE — LETTER
"  EMPLOYEE’S CLAIM FOR COMPENSATION/ REPORT OF INITIAL TREATMENT  FORM C-4  PLEASE TYPE OR PRINT    EMPLOYEE’S CLAIM - PROVIDE ALL INFORMATION REQUESTED   First Name                    HOA Pimentel                  Last Name  Celi Huff Birthdate                    2000                Sex  [x]Male Claim Number (Insurer’s Use Only)     Mailing Address  [address] Age  24 y.o. 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 (Be specific and answer in detail. Use additional sheet if necessary)  When I was chipping hammering, the hammer when I hit the wall with  my hand.   If you believe that you have an occupational disease, when did you first have knowledge of the disability and its relationship to your employment?   Witnesses to the Accident (if applicable)  Karthik Rojas of Injury or Occupational Disease  Workers' Compensation  Part(s) of Body Injured or Affected  Hand (R) Finger (R)     I CERTIFY THAT THE ABOVE IS TRUE AND CORRECT TO T HE BEST OF MY " KNOWLEDGE AND THAT I HAVE PROVIDED THIS INFORMATION IN ORDER TO OBTAIN THE BENEFITS OF [place], [place], OR CHAPTER 617 OF [place]).  I HEREBY AUTHORIZE ANY PHYSICIAN, CHIROPRACTOR, SURGEON, PRACTITIONER OR ANY OTHER PERSON, ANY HOSPITAL, INCLUDING [facility] OR [facility], ANY  MEDICAL SERVICE ORGANIZATION, ANY INSURANCE COMPANY, OR OTHER INSTITUTION OR ORGANIZATION TO RELEASE TO EACH OTHER, ANY MEDICAL OR OTHER INFORMATION, INCLUDING BENEFITS PAID OR PAYABLE, PERTINENT TO THIS INJURY OR DISEASE, EXCEPT INFORMATION RELATIVE TO DIAGNOSIS, TREATMENT AND/OR COUNSELING FOR AIDS, PSYCHOLOGICAL CONDITIONS, ALCOHOL OR CONTROLLED SUBSTANCES, FOR WHICH I MUST GIVE SPECIFIC AUTHORIZATION.  A PHOTOSTAT OF THIS AUTHORIZATION SHALL BE VALID AS THE ORIGINAL.     Date   Place Employee’s Original or  *Electronic Signature   THIS REPORT MUST BE COMPLETED AND MAILED WITHIN 3 WORKING DAYS OF TREATMENT   Place  [facility], EMERGENCY DEPT    Name of Facility      Date 3/24/2025 Diagnosis and Description of Injury or Occupational Disease  (S62.632A) Closed displaced fracture of distal phalanx of right middle finger, initial encounter  The encounter diagnosis was Closed displaced fracture of distal phalanx of right middle finger, initial encounter. Is there evidence that the injured employee was under the influence of alcohol and/or another controlled substance at the time of accident?  []No  [] Yes (if yes, please explain)   Hour       Treatment:      Have you advised the patient to remain off work five days or more?      [] Yes  If yes, indicate dates: From_ _                                                      To __ _  [] No   If no, is the injured employee capable of: [] full duty     [] modified duty      If modified duty, specify any limitations / restrictions:__________________  ___ ___________________________     X-Ray  Findings:      From information given by the employee, together with medical evidence, can you directly connect this injury or occupational disease as job incurred?  []Yes   [] No      Is additional medical care by a physician indicated? []Yes [] No       Do you know of any previous injury or disease contributing to this condition or occupational disease? []Yes [] No (Explain if yes)                              Date  3/25/2025 Print Health Care Provider’s Name  No name on file I certify that the employer’s copy of  this form was delivered to the employer on:   Address    INSURER'S USE ONLY                       City    State    Zip    Provider’s Tax ID Number      Telephone  Dept: [phone]    Health Care Provider’s Original or Electronic Signature           Degree (MD,DO, DC,PA-C,APRN)  {Provider Degrees:50890}  Choose (if applicable)      ORIGINAL - TREATING HEALTHCARE PROVIDER PAGE 2 - INSURER/TPA PAGE 3 - EMPLOYER PAGE 4 - EMPLOYEE             Form C-4 (rev.02/25)

## 2025-03-24 NOTE — LETTER
"  EMPLOYEE’S CLAIM FOR COMPENSATION/ REPORT OF INITIAL TREATMENT  FORM C-4  PLEASE TYPE OR PRINT    EMPLOYEE’S CLAIM - PROVIDE ALL INFORMATION REQUESTED   First Name                    HOA Pimentel                  Last Name  Celi Huff Birthdate                    2000                Sex  [x]Male Claim Number (Insurer’s Use Only)     Mailing Address  [address] Age  24 y.o. Height  1.88 m (6' 2\") Weight  89.1 kg (196 lb 6.9 oz) Social Security Number     [facility] Zip  [address] Telephone  [phone] (home)    Email  [email or web address]    Primary Language Spoken  English    INSURER   THIRD-PARTY   Laborers   Employee's Occupation (Job Title) When Injury or Occupational Disease Occurred  [employer]    Employer's Name/Company Name    [employer] Telephone  [phone]    Office Mail Address ([facility] and [address])    [address] [place] [address]   Date of Injury (if applicable) 3/19/2025               Hours Injury (if applicable)  3:00 PM am    pm Date Employer Notified  3/19/2025 Last Day of Work after Injury or Occupational Disease  3/21/2025 Supervisor to Whom Injury Reported  [place]   Address or Location of Accident (if applicable)  Work [1]   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 (Be specific and answer in detail. Use additional sheet if necessary)  When I was chipping hammering, the hammer slipped when I hit the wall with  my hand.   If you believe that you have an occupational disease, when did you first have knowledge of the disability and its relationship to your employment?  N/A Witnesses to the Accident (if applicable)  Karthik Rojas of Injury or Occupational Disease  Workers' Compensation  Part(s) of Body Injured or Affected  Hand (R) Finger " (R) N/A    I CERTIFY THAT THE ABOVE IS TRUE AND CORRECT TO T HE BEST OF MY KNOWLEDGE AND THAT I HAVE PROVIDED THIS INFORMATION IN ORDER TO OBTAIN THE BENEFITS OF [place], OR CHAPTER 617 OF [place]).  I HEREBY AUTHORIZE ANY PHYSICIAN, CHIROPRACTOR, SURGEON, PRACTITIONER OR ANY OTHER PERSON, ANY HOSPITAL, INCLUDING [facility] OR [facility], ANY  MEDICAL SERVICE ORGANIZATION, ANY INSURANCE COMPANY, OR OTHER INSTITUTION OR ORGANIZATION TO RELEASE TO EACH OTHER, ANY MEDICAL OR OTHER INFORMATION, INCLUDING BENEFITS PAID OR PAYABLE, PERTINENT TO THIS INJURY OR DISEASE, EXCEPT INFORMATION RELATIVE TO DIAGNOSIS, TREATMENT AND/OR COUNSELING FOR AIDS, PSYCHOLOGICAL CONDITIONS, ALCOHOL OR CONTROLLED SUBSTANCES, FOR WHICH I MUST GIVE SPECIFIC AUTHORIZATION.  A PHOTOSTAT OF THIS AUTHORIZATION SHALL BE VALID AS THE ORIGINAL.     Date     3/25/2025   Place     [facility] Employee’s Original or  *Electronic Signature   THIS REPORT MUST BE COMPLETED AND MAILED WITHIN 3 WORKING DAYS OF TREATMENT   Place  [facility], EMERGENCY DEPT    Name of Facility      Date 3/24/2025 Diagnosis and Description of Injury or Occupational Disease  (S62.632A) Closed displaced fracture of distal phalanx of right middle finger, initial encounter  The encounter diagnosis was Closed displaced fracture of distal phalanx of right middle finger, initial encounter. Is there evidence that the injured employee was under the influence of alcohol and/or another controlled substance at the time of accident?  []No  [] Yes (if yes, please explain)   Hour   No   Treatment: Exam, xray with comminuted fx distal phalanx, aluminum splint placed  Tylenol given  as well as Ortho referral    Have you advised the patient to remain off work five days or more?   No  [] Yes  If yes, indicate dates: From_ _                                                      To __  _  [] No   If no, is the injured employee capable of: [] full duty     [] modified duty Yes    If modified duty, specify any limitations / restrictions:__________________  ___Light duty for 3 weeks___________________________     X-Ray Findings: Positive    From information given by the employee, together with medical evidence, can you directly connect this injury or occupational disease as job incurred?  []Yes   [] No Yes    Is additional medical care by a physician indicated? []Yes [] No  Yes    Do you know of any previous injury or disease contributing to this condition or occupational disease? []Yes [] No (Explain if yes)                          No   Date  3/25/2025 Print Health Care Provider’s Name  No name on file I certify that the employer’s copy of  this form was delivered to the employer on:   Address    INSURER'S USE ONLY                       City    State    Zip    Provider’s Tax ID Number      [place]  Dept: [phone]    Health Care Provider’s Original or Electronic Signature      e-LORENZO Dalton D.O.    Degree (MD,DO, DC,PA-C,APRN)  DO  Choose (if applicable)      ORIGINAL - TREATING HEALTHCARE PROVIDER PAGE 2 - INSURER/TPA PAGE 3 - EMPLOYER PAGE 4 - EMPLOYEE             Form C-4 (rev.02/25)

## 2025-03-25 VITALS
HEART RATE: 71 BPM | HEIGHT: 74 IN | WEIGHT: 196.43 LBS | OXYGEN SATURATION: 95 % | RESPIRATION RATE: 16 BRPM | SYSTOLIC BLOOD PRESSURE: 130 MMHG | TEMPERATURE: 97.6 F | DIASTOLIC BLOOD PRESSURE: 68 MMHG | BODY MASS INDEX: 25.21 KG/M2

## 2025-03-25 PROCEDURE — A9270 NON-COVERED ITEM OR SERVICE: HCPCS | Mod: UD | Performed by: EMERGENCY MEDICINE

## 2025-03-25 PROCEDURE — 700102 HCHG RX REV CODE 250 W/ 637 OVERRIDE(OP): Mod: UD | Performed by: EMERGENCY MEDICINE

## 2025-03-25 RX ADMIN — ACETAMINOPHEN 1000 MG: 500 TABLET ORAL at 00:00

## 2025-03-25 ASSESSMENT — PAIN DESCRIPTION - PAIN TYPE: TYPE: ACUTE PAIN

## 2025-03-25 NOTE — DISCHARGE INSTRUCTIONS
Wear splint at all times except for bathing.  Tylenol or ibuprofen for pain.  Call to schedule appointment with Ortho in 1-2 days.

## 2025-03-25 NOTE — ED TRIAGE NOTES
"Chief Complaint   Patient presents with    Digit Pain     Pt reports injury to Rt middle finger last Wednesday. Pt reports he works for the [employer] as a . CMS intact      /83   Pulse 72   Temp 36.3 °C (97.3 °F) (Temporal)   Resp 14   Ht 1.88 m (6' 2\")   Wt 89.1 kg (196 lb 6.9 oz)   SpO2 98%   BMI 25.22 kg/m²     Pt to triage for above compliant. NADN.      Pt placed in lobby and educated on triage process. Pt encouraged to alert staff for any changes, pt verbalized understanding.       "

## 2025-03-25 NOTE — ED NOTES
Pt ambulatory from [facility] to T-1. Connected to monitor, bed in lowest position, side rails up, given call light. Chart up for ERP.

## 2025-03-25 NOTE — ED PROVIDER NOTES
ER Provider Note    Scribed for Dr. Pooja Butler D.O. by Tori Barriga. 3/24/2025  11:31 PM    Primary Care Provider: Sage Fischer M.D.    CHIEF COMPLAINT  Chief Complaint   Patient presents with    Digit Pain     Pt reports injury to Rt middle finger last Wednesday. Pt reports he works for the [email or web address] as a . CMS intact        EXTERNAL RECORDS REVIEWED  No pertinent medical records    HPI/ROS  LIMITATION TO HISTORY   Select: : None    Margarito Huff is a 24 y.o. male who presents to the ED for digit pain onset last Wednesday. The patient explains he works for the [email or web address] as a  when his hammer slipped from his hand and he smashed his right middle finger between some rocks. Patient states he was not able to have his finger seen for medical treatment at that time. He presents today for persisting pain. No alleviating or exacerbating factors noted.    PAST MEDICAL HISTORY  Past Medical History:   Diagnosis Date    ADHD        SURGICAL HISTORY  History reviewed. No pertinent surgical history.    FAMILY HISTORY  History reviewed. No pertinent family history.    SOCIAL HISTORY   reports that he has never smoked. He has never used smokeless tobacco. He reports that he does not currently use alcohol. He reports that he does not currently use drugs.    CURRENT MEDICATIONS  Discharge Medication List as of 3/25/2025  1:13 AM        CONTINUE these medications which have NOT CHANGED    Details   ondansetron (ZOFRAN ODT) 4 MG TABLET DISPERSIBLE Take 1 Tablet by mouth every 6 hours as needed for Nausea/Vomiting for up to 12 doses., Disp-10 Tablet, R-0, Normal      guanFACINE (TENEX) 1 MG Tab Take 1 mg by mouth every day., Historical Med      amphetamine-dextroamphetamine XR (ADDERALL XR) 10 MG CAPSULE SR 24 HR Take 10 mg by mouth every morning., Historical Med             ALLERGIES  Patient has no known allergies.    PHYSICAL EXAM  /75   Pulse 69   Temp 36.3 °C (97.3 °F) (Temporal)   Resp 14   Ht 1.88  "m (6' 2\")   Wt 89.1 kg (196 lb 6.9 oz)   SpO2 98%   BMI 25.22 kg/m²   Constitutional: Patient is well developed, well nourished. Non-toxic appearing. Mild distress.   HENT: Normocephalic, atraumatic.  Nares are patent and clear, oral mucosa moist.  Cardiovascular: Normal heart rate and Regular rhythm. No murmur,  Thorax & Lungs: Clear and equal breath sounds with good excursion. No respiratory distress  Abdomen: Bowel sounds normal in all four quadrants. Soft,nontender  Skin: Warm, Dry  Extremities: Peripheral pulses 4/4  Right middle finger has a healing subungual hematoma, no erythema or warmth. Moderate tissue swelling from PIP to the DIP, no laceration, neurovascular intact, very tender to the touch.  Neurologic: Alert & oriented x 3, Normal motor function, Normal sensory function,  Psychiatric: Affect normal, Judgment normal, Mood normal.     DIAGNOSTIC STUDIES & PROCEDURES    Radiology:   The attending Emergency Physician has independently interpreted the diagnostic imaging associated with this visit and is awaiting the final reading from the radiologist, which will be displayed below.    Preliminary interpretation is a follows: Comminuted fracture of the tuft of the right middle finger.  Radiologist interpretation:    DX-FINGER(S) 2+ RIGHT   Final Result      Comminuted fracture of RIGHT 3rd tuft with associated soft tissue swelling.         COURSE & MEDICAL DECISION MAKING    INITIAL ASSESSMENT AND PLAN  Care Narrative:       11:31 PM - Patient seen and evaluated at bedside. Discussed plan of care, including imaging. Patient agrees to plan of care. Patient will be treated with Tylenol for his symptoms. Ordered DX-Fingers right to evaluate. Differential diagnoses include but are not limited to: Fracture versus contusion    X-ray shows a fracture of the right middle finger distal phalanx/tuft.  Patient was placed in an aluminum splint with a dressing.  He was given Tylenol here in the ER at his request.  He " is instructed to take Tylenol or ibuprofen for pain.  He will be referred outpatient to [facility] for further treatment.  He is to wear the splint at all times except for bathing.    12:16 AM - I reassessed the patient. I informed him his imaging results showed a comminuted fracture of the right 3rd tuft. I discussed at home management plan. Patient had the opportunity to ask any questions. The plan for discharge was discussed with them and they were told to return for any new or worsening symptoms. He was also informed of the plans for follow up. Patient is understanding and agreeable to the plan for discharge.                 DISPOSITION AND DISCUSSIONS  I have discussed management of the patient with the following physicians and MEGHAN's: None    Discussion of management with other [facility] or appropriate source(s): None     Barriers to care at this time, including but not limited to:  None .     Decision tools and prescription drugs considered including, but not limited to: Tylenol.    The patient will return for new or worsening symptoms and is stable at the time of discharge.    The patient is referred to a primary physician for blood pressure management, diabetic screening, and for all other preventative health concerns.    DISPOSITION:  Patient will be discharged home in stable condition.    FOLLOW UP:  Gideon Peña M.D.  [address] [facility] [address]  [phone]    Schedule an appointment as soon as possible for a visit in 2 days  for recheck    OUTPATIENT MEDICATIONS:  Discharge Medication List as of 3/25/2025  1:13 AM         FINAL IMPRESSION   1. Closed displaced fracture of distal phalanx of right middle finger, initial encounter      ITori), am scribing for, and in the presence of, Pooja Butler D.O..    Electronically signed by: Tori Hooks), 3/24/2025    Pooja ALFARO D.O. personally performed the services described in this documentation, as scribed by  Tori Barriga in my presence, and it is both accurate and complete.    The note accurately reflects work and decisions made by me.  Pooja Butler D.O.  3/25/2025  5:08 AM